# Patient Record
Sex: MALE | Race: WHITE | Employment: UNEMPLOYED | URBAN - METROPOLITAN AREA
[De-identification: names, ages, dates, MRNs, and addresses within clinical notes are randomized per-mention and may not be internally consistent; named-entity substitution may affect disease eponyms.]

---

## 2019-01-28 ENCOUNTER — HOSPITAL ENCOUNTER (OUTPATIENT)
Facility: CLINIC | Age: 39
Discharge: HOME | End: 2019-01-28
Attending: EMERGENCY MEDICINE | Admitting: SURGERY
Payer: COMMERCIAL

## 2019-01-28 ENCOUNTER — ANESTHESIA EVENT (OUTPATIENT)
Dept: SURGERY | Facility: CLINIC | Age: 39
End: 2019-01-28

## 2019-01-28 ENCOUNTER — APPOINTMENT (OUTPATIENT)
Dept: ULTRASOUND IMAGING | Facility: CLINIC | Age: 39
End: 2019-01-28
Payer: COMMERCIAL

## 2019-01-28 ENCOUNTER — ANESTHESIA (OUTPATIENT)
Dept: SURGERY | Facility: CLINIC | Age: 39
End: 2019-01-28

## 2019-01-28 VITALS
OXYGEN SATURATION: 97 % | HEART RATE: 55 BPM | TEMPERATURE: 97.8 F | SYSTOLIC BLOOD PRESSURE: 136 MMHG | HEIGHT: 67 IN | DIASTOLIC BLOOD PRESSURE: 78 MMHG | WEIGHT: 200 LBS | BODY MASS INDEX: 31.39 KG/M2 | RESPIRATION RATE: 20 BRPM

## 2019-01-28 DIAGNOSIS — R10.11 RUQ ABDOMINAL PAIN: ICD-10-CM

## 2019-01-28 LAB
ALBUMIN SERPL-MCNC: 4 G/DL (ref 3.4–5)
ALP SERPL-CCNC: 81 U/L (ref 40–150)
ALT SERPL W P-5'-P-CCNC: 39 U/L (ref 0–70)
ANION GAP SERPL CALCULATED.3IONS-SCNC: 7 MMOL/L (ref 3–14)
AST SERPL W P-5'-P-CCNC: 22 U/L (ref 0–45)
BASOPHILS # BLD AUTO: 0 10E9/L (ref 0–0.2)
BASOPHILS NFR BLD AUTO: 0.2 %
BILIRUB SERPL-MCNC: 0.6 MG/DL (ref 0.2–1.3)
BUN SERPL-MCNC: 18 MG/DL (ref 7–30)
CALCIUM SERPL-MCNC: 8.6 MG/DL (ref 8.5–10.1)
CHLORIDE SERPL-SCNC: 107 MMOL/L (ref 94–109)
CO2 SERPL-SCNC: 29 MMOL/L (ref 20–32)
CREAT SERPL-MCNC: 1.07 MG/DL (ref 0.66–1.25)
DIFFERENTIAL METHOD BLD: ABNORMAL
EOSINOPHIL # BLD AUTO: 0 10E9/L (ref 0–0.7)
EOSINOPHIL NFR BLD AUTO: 0.2 %
ERYTHROCYTE [DISTWIDTH] IN BLOOD BY AUTOMATED COUNT: 13.2 % (ref 10–15)
GFR SERPL CREATININE-BSD FRML MDRD: 88 ML/MIN/{1.73_M2}
GLUCOSE SERPL-MCNC: 126 MG/DL (ref 70–99)
HCT VFR BLD AUTO: 42.5 % (ref 40–53)
HGB BLD-MCNC: 13.9 G/DL (ref 13.3–17.7)
IMM GRANULOCYTES # BLD: 0 10E9/L (ref 0–0.4)
IMM GRANULOCYTES NFR BLD: 0.2 %
LIPASE SERPL-CCNC: 122 U/L (ref 73–393)
LYMPHOCYTES # BLD AUTO: 0.7 10E9/L (ref 0.8–5.3)
LYMPHOCYTES NFR BLD AUTO: 5.6 %
MCH RBC QN AUTO: 28.2 PG (ref 26.5–33)
MCHC RBC AUTO-ENTMCNC: 32.7 G/DL (ref 31.5–36.5)
MCV RBC AUTO: 86 FL (ref 78–100)
MONOCYTES # BLD AUTO: 0.4 10E9/L (ref 0–1.3)
MONOCYTES NFR BLD AUTO: 3.1 %
NEUTROPHILS # BLD AUTO: 11.3 10E9/L (ref 1.6–8.3)
NEUTROPHILS NFR BLD AUTO: 90.7 %
NRBC # BLD AUTO: 0 10*3/UL
NRBC BLD AUTO-RTO: 0 /100
PLATELET # BLD AUTO: 229 10E9/L (ref 150–450)
POTASSIUM SERPL-SCNC: 4 MMOL/L (ref 3.4–5.3)
PROT SERPL-MCNC: 7.2 G/DL (ref 6.8–8.8)
RBC # BLD AUTO: 4.93 10E12/L (ref 4.4–5.9)
SODIUM SERPL-SCNC: 143 MMOL/L (ref 133–144)
WBC # BLD AUTO: 12.4 10E9/L (ref 4–11)

## 2019-01-28 PROCEDURE — 96365 THER/PROPH/DIAG IV INF INIT: CPT | Mod: 59

## 2019-01-28 PROCEDURE — 71000027 ZZH RECOVERY PHASE 2 EACH 15 MINS: Performed by: SURGERY

## 2019-01-28 PROCEDURE — 37000009 ZZH ANESTHESIA TECHNICAL FEE, EACH ADDTL 15 MIN: Performed by: SURGERY

## 2019-01-28 PROCEDURE — 25000128 H RX IP 250 OP 636: Performed by: EMERGENCY MEDICINE

## 2019-01-28 PROCEDURE — 25000132 ZZH RX MED GY IP 250 OP 250 PS 637: Performed by: EMERGENCY MEDICINE

## 2019-01-28 PROCEDURE — 36000056 ZZH SURGERY LEVEL 3 1ST 30 MIN: Performed by: SURGERY

## 2019-01-28 PROCEDURE — 76705 ECHO EXAM OF ABDOMEN: CPT

## 2019-01-28 PROCEDURE — 83690 ASSAY OF LIPASE: CPT | Performed by: EMERGENCY MEDICINE

## 2019-01-28 PROCEDURE — 25000128 H RX IP 250 OP 636: Performed by: NURSE ANESTHETIST, CERTIFIED REGISTERED

## 2019-01-28 PROCEDURE — 96361 HYDRATE IV INFUSION ADD-ON: CPT | Mod: 59

## 2019-01-28 PROCEDURE — 47562 LAPAROSCOPIC CHOLECYSTECTOMY: CPT | Performed by: SURGERY

## 2019-01-28 PROCEDURE — 25000125 ZZHC RX 250: Performed by: EMERGENCY MEDICINE

## 2019-01-28 PROCEDURE — 85025 COMPLETE CBC W/AUTO DIFF WBC: CPT | Performed by: EMERGENCY MEDICINE

## 2019-01-28 PROCEDURE — 25000132 ZZH RX MED GY IP 250 OP 250 PS 637: Performed by: NURSE ANESTHETIST, CERTIFIED REGISTERED

## 2019-01-28 PROCEDURE — 99204 OFFICE O/P NEW MOD 45 MIN: CPT | Mod: 57 | Performed by: SURGERY

## 2019-01-28 PROCEDURE — 71000012 ZZH RECOVERY PHASE 1 LEVEL 1 FIRST HR: Performed by: SURGERY

## 2019-01-28 PROCEDURE — 96375 TX/PRO/DX INJ NEW DRUG ADDON: CPT

## 2019-01-28 PROCEDURE — 88304 TISSUE EXAM BY PATHOLOGIST: CPT | Performed by: SURGERY

## 2019-01-28 PROCEDURE — 99285 EMERGENCY DEPT VISIT HI MDM: CPT | Mod: 25 | Performed by: EMERGENCY MEDICINE

## 2019-01-28 PROCEDURE — 96376 TX/PRO/DX INJ SAME DRUG ADON: CPT | Mod: 59

## 2019-01-28 PROCEDURE — 25000125 ZZHC RX 250: Performed by: NURSE ANESTHETIST, CERTIFIED REGISTERED

## 2019-01-28 PROCEDURE — 80053 COMPREHEN METABOLIC PANEL: CPT | Performed by: EMERGENCY MEDICINE

## 2019-01-28 PROCEDURE — 36000058 ZZH SURGERY LEVEL 3 EA 15 ADDTL MIN: Performed by: SURGERY

## 2019-01-28 PROCEDURE — 27210794 ZZH OR GENERAL SUPPLY STERILE: Performed by: SURGERY

## 2019-01-28 PROCEDURE — 88304 TISSUE EXAM BY PATHOLOGIST: CPT | Mod: 26 | Performed by: SURGERY

## 2019-01-28 PROCEDURE — 40000305 ZZH STATISTIC PRE PROC ASSESS I: Performed by: SURGERY

## 2019-01-28 PROCEDURE — 99285 EMERGENCY DEPT VISIT HI MDM: CPT | Mod: 25

## 2019-01-28 PROCEDURE — 25000125 ZZHC RX 250: Performed by: SURGERY

## 2019-01-28 PROCEDURE — 37000008 ZZH ANESTHESIA TECHNICAL FEE, 1ST 30 MIN: Performed by: SURGERY

## 2019-01-28 PROCEDURE — 27210995 ZZH RX 272: Performed by: SURGERY

## 2019-01-28 RX ORDER — LIDOCAINE 40 MG/G
CREAM TOPICAL
Status: DISCONTINUED | OUTPATIENT
Start: 2019-01-28 | End: 2019-01-28 | Stop reason: HOSPADM

## 2019-01-28 RX ORDER — HYDROMORPHONE HYDROCHLORIDE 1 MG/ML
0.3 INJECTION, SOLUTION INTRAMUSCULAR; INTRAVENOUS; SUBCUTANEOUS
Status: COMPLETED | OUTPATIENT
Start: 2019-01-28 | End: 2019-01-28

## 2019-01-28 RX ORDER — NALOXONE HYDROCHLORIDE 0.4 MG/ML
.1-.4 INJECTION, SOLUTION INTRAMUSCULAR; INTRAVENOUS; SUBCUTANEOUS
Status: DISCONTINUED | OUTPATIENT
Start: 2019-01-28 | End: 2019-01-28 | Stop reason: HOSPADM

## 2019-01-28 RX ORDER — FENTANYL CITRATE 50 UG/ML
INJECTION, SOLUTION INTRAMUSCULAR; INTRAVENOUS PRN
Status: DISCONTINUED | OUTPATIENT
Start: 2019-01-28 | End: 2019-01-28

## 2019-01-28 RX ORDER — KETOROLAC TROMETHAMINE 30 MG/ML
INJECTION, SOLUTION INTRAMUSCULAR; INTRAVENOUS PRN
Status: DISCONTINUED | OUTPATIENT
Start: 2019-01-28 | End: 2019-01-28

## 2019-01-28 RX ORDER — MEPERIDINE HYDROCHLORIDE 25 MG/ML
12.5 INJECTION INTRAMUSCULAR; INTRAVENOUS; SUBCUTANEOUS
Status: DISCONTINUED | OUTPATIENT
Start: 2019-01-28 | End: 2019-01-28 | Stop reason: HOSPADM

## 2019-01-28 RX ORDER — FENTANYL CITRATE 50 UG/ML
25-50 INJECTION, SOLUTION INTRAMUSCULAR; INTRAVENOUS
Status: CANCELLED | OUTPATIENT
Start: 2019-01-28

## 2019-01-28 RX ORDER — LEVOFLOXACIN 5 MG/ML
500 INJECTION, SOLUTION INTRAVENOUS EVERY 24 HOURS
Status: DISCONTINUED | OUTPATIENT
Start: 2019-01-28 | End: 2019-01-28 | Stop reason: HOSPADM

## 2019-01-28 RX ORDER — CELECOXIB 200 MG/1
200 CAPSULE ORAL ONCE
Status: COMPLETED | OUTPATIENT
Start: 2019-01-28 | End: 2019-01-28

## 2019-01-28 RX ORDER — ONDANSETRON 2 MG/ML
4 INJECTION INTRAMUSCULAR; INTRAVENOUS EVERY 30 MIN PRN
Status: DISCONTINUED | OUTPATIENT
Start: 2019-01-28 | End: 2019-01-28 | Stop reason: HOSPADM

## 2019-01-28 RX ORDER — ACETAMINOPHEN 325 MG/1
975 TABLET ORAL ONCE
Status: COMPLETED | OUTPATIENT
Start: 2019-01-28 | End: 2019-01-28

## 2019-01-28 RX ORDER — HYDROMORPHONE HYDROCHLORIDE 1 MG/ML
.3-.5 INJECTION, SOLUTION INTRAMUSCULAR; INTRAVENOUS; SUBCUTANEOUS EVERY 10 MIN PRN
Status: DISCONTINUED | OUTPATIENT
Start: 2019-01-28 | End: 2019-01-28 | Stop reason: HOSPADM

## 2019-01-28 RX ORDER — FENTANYL CITRATE 50 UG/ML
25-50 INJECTION, SOLUTION INTRAMUSCULAR; INTRAVENOUS
Status: DISCONTINUED | OUTPATIENT
Start: 2019-01-28 | End: 2019-01-28 | Stop reason: HOSPADM

## 2019-01-28 RX ORDER — ONDANSETRON 4 MG/1
4 TABLET, ORALLY DISINTEGRATING ORAL EVERY 30 MIN PRN
Status: DISCONTINUED | OUTPATIENT
Start: 2019-01-28 | End: 2019-01-28 | Stop reason: HOSPADM

## 2019-01-28 RX ORDER — KETOROLAC TROMETHAMINE 15 MG/ML
15 INJECTION, SOLUTION INTRAMUSCULAR; INTRAVENOUS ONCE
Status: COMPLETED | OUTPATIENT
Start: 2019-01-28 | End: 2019-01-28

## 2019-01-28 RX ORDER — SODIUM CHLORIDE, SODIUM LACTATE, POTASSIUM CHLORIDE, CALCIUM CHLORIDE 600; 310; 30; 20 MG/100ML; MG/100ML; MG/100ML; MG/100ML
INJECTION, SOLUTION INTRAVENOUS CONTINUOUS
Status: DISCONTINUED | OUTPATIENT
Start: 2019-01-28 | End: 2019-01-28 | Stop reason: HOSPADM

## 2019-01-28 RX ORDER — DEXAMETHASONE SODIUM PHOSPHATE 4 MG/ML
INJECTION, SOLUTION INTRA-ARTICULAR; INTRALESIONAL; INTRAMUSCULAR; INTRAVENOUS; SOFT TISSUE PRN
Status: DISCONTINUED | OUTPATIENT
Start: 2019-01-28 | End: 2019-01-28

## 2019-01-28 RX ORDER — BUPIVACAINE HYDROCHLORIDE AND EPINEPHRINE 5; 5 MG/ML; UG/ML
INJECTION, SOLUTION PERINEURAL PRN
Status: DISCONTINUED | OUTPATIENT
Start: 2019-01-28 | End: 2019-01-28 | Stop reason: HOSPADM

## 2019-01-28 RX ORDER — KETOROLAC TROMETHAMINE 30 MG/ML
30 INJECTION, SOLUTION INTRAMUSCULAR; INTRAVENOUS EVERY 6 HOURS PRN
Status: DISCONTINUED | OUTPATIENT
Start: 2019-01-28 | End: 2019-01-28 | Stop reason: HOSPADM

## 2019-01-28 RX ORDER — INDOCYANINE GREEN AND WATER 25 MG
2.5 KIT INJECTION ONCE
Status: COMPLETED | OUTPATIENT
Start: 2019-01-28 | End: 2019-01-28

## 2019-01-28 RX ORDER — PROPOFOL 10 MG/ML
INJECTION, EMULSION INTRAVENOUS PRN
Status: DISCONTINUED | OUTPATIENT
Start: 2019-01-28 | End: 2019-01-28

## 2019-01-28 RX ORDER — MORPHINE SULFATE 4 MG/ML
4 INJECTION, SOLUTION INTRAMUSCULAR; INTRAVENOUS
Status: COMPLETED | OUTPATIENT
Start: 2019-01-28 | End: 2019-01-28

## 2019-01-28 RX ORDER — HYDROCODONE BITARTRATE AND ACETAMINOPHEN 5; 325 MG/1; MG/1
1-2 TABLET ORAL EVERY 4 HOURS PRN
Status: DISCONTINUED | OUTPATIENT
Start: 2019-01-28 | End: 2019-01-28 | Stop reason: HOSPADM

## 2019-01-28 RX ORDER — HYDROCODONE BITARTRATE AND ACETAMINOPHEN 5; 325 MG/1; MG/1
1-2 TABLET ORAL EVERY 4 HOURS PRN
Qty: 20 TABLET | Refills: 0 | Status: SHIPPED | OUTPATIENT
Start: 2019-01-28 | End: 2019-02-27

## 2019-01-28 RX ADMIN — INDOCYANINE GREEN 2.5 MG: 25 INJECTION, POWDER, LYOPHILIZED, FOR SOLUTION INTRAVENOUS at 11:19

## 2019-01-28 RX ADMIN — TAZOBACTAM SODIUM AND PIPERACILLIN SODIUM 3.38 G: 375; 3 INJECTION, SOLUTION INTRAVENOUS at 07:37

## 2019-01-28 RX ADMIN — ACETAMINOPHEN 975 MG: 325 TABLET, FILM COATED ORAL at 10:33

## 2019-01-28 RX ADMIN — DEXAMETHASONE SODIUM PHOSPHATE 8 MG: 4 INJECTION, SOLUTION INTRA-ARTICULAR; INTRALESIONAL; INTRAMUSCULAR; INTRAVENOUS; SOFT TISSUE at 12:07

## 2019-01-28 RX ADMIN — MORPHINE SULFATE 4 MG: 4 INJECTION INTRAVENOUS at 08:20

## 2019-01-28 RX ADMIN — Medication 2 TABLET: at 13:59

## 2019-01-28 RX ADMIN — ROCURONIUM BROMIDE 50 MG: 10 INJECTION INTRAVENOUS at 12:07

## 2019-01-28 RX ADMIN — SODIUM CHLORIDE, POTASSIUM CHLORIDE, SODIUM LACTATE AND CALCIUM CHLORIDE: 600; 310; 30; 20 INJECTION, SOLUTION INTRAVENOUS at 12:04

## 2019-01-28 RX ADMIN — LIDOCAINE HYDROCHLORIDE 30 ML: 20 SOLUTION ORAL; TOPICAL at 05:29

## 2019-01-28 RX ADMIN — ONDANSETRON 4 MG: 2 INJECTION INTRAMUSCULAR; INTRAVENOUS at 05:30

## 2019-01-28 RX ADMIN — PROPOFOL 200 MG: 10 INJECTION, EMULSION INTRAVENOUS at 12:07

## 2019-01-28 RX ADMIN — CELECOXIB 200 MG: 200 CAPSULE ORAL at 10:33

## 2019-01-28 RX ADMIN — MIDAZOLAM 2 MG: 1 INJECTION INTRAMUSCULAR; INTRAVENOUS at 12:04

## 2019-01-28 RX ADMIN — KETOROLAC TROMETHAMINE 30 MG: 30 INJECTION, SOLUTION INTRAMUSCULAR at 12:07

## 2019-01-28 RX ADMIN — HYDROMORPHONE HYDROCHLORIDE 0.3 MG: 1 INJECTION, SOLUTION INTRAMUSCULAR; INTRAVENOUS; SUBCUTANEOUS at 10:34

## 2019-01-28 RX ADMIN — MORPHINE SULFATE 4 MG: 4 INJECTION INTRAVENOUS at 07:10

## 2019-01-28 RX ADMIN — ONDANSETRON 4 MG: 2 INJECTION INTRAMUSCULAR; INTRAVENOUS at 12:07

## 2019-01-28 RX ADMIN — LIDOCAINE HYDROCHLORIDE 40 MG: 10 INJECTION, SOLUTION EPIDURAL; INFILTRATION; INTRACAUDAL; PERINEURAL at 12:07

## 2019-01-28 RX ADMIN — KETOROLAC TROMETHAMINE 15 MG: 15 INJECTION, SOLUTION INTRAMUSCULAR; INTRAVENOUS at 05:30

## 2019-01-28 RX ADMIN — HYDROMORPHONE HYDROCHLORIDE 0.5 MG: 1 INJECTION, SOLUTION INTRAMUSCULAR; INTRAVENOUS; SUBCUTANEOUS at 13:41

## 2019-01-28 RX ADMIN — FENTANYL CITRATE 200 MCG: 50 INJECTION, SOLUTION INTRAMUSCULAR; INTRAVENOUS at 12:07

## 2019-01-28 RX ADMIN — MORPHINE SULFATE 4 MG: 4 INJECTION INTRAVENOUS at 05:30

## 2019-01-28 RX ADMIN — FENTANYL CITRATE 50 MCG: 50 INJECTION, SOLUTION INTRAMUSCULAR; INTRAVENOUS at 12:59

## 2019-01-28 SDOH — HEALTH STABILITY: MENTAL HEALTH: HOW OFTEN DO YOU HAVE A DRINK CONTAINING ALCOHOL?: NEVER

## 2019-01-28 ASSESSMENT — MIFFLIN-ST. JEOR
SCORE: 1790.82
SCORE: 1790.82

## 2019-01-28 ASSESSMENT — ENCOUNTER SYMPTOMS
VOMITING: 0
SHORTNESS OF BREATH: 0
FEVER: 0
ABDOMINAL PAIN: 1
NAUSEA: 0
DIARRHEA: 0

## 2019-01-28 ASSESSMENT — PAIN DESCRIPTION - DESCRIPTORS: DESCRIPTORS: CONSTANT

## 2019-01-28 NOTE — DISCHARGE INSTRUCTIONS
Wash incisions daily with soap and water. Some mild redness or swelling is expected. If draining, cover with dry gauze.    No lifting restrictions.  You may lift as comfort permits.    Okay to use ice pack over wound as necessary for comfort.    Use pain medication as necessary but avoid constipating side effects. Ibuprofen okay but avoid Tylenol as your pain medication already contains this.    Diet as tolerated. No restrictions.    Follow up with Dr Nance in 1-2 weeks.                            Same Day Surgery Discharge Instructions  Special Precautions After Surgery - Adult    1. It is not unusual to feel lightheaded or faint, up to 24 hours after surgery or while taking pain medication.  If you have these symptoms; sit for a few minutes before standing and have someone assist you when getting up.  2. You should rest and relax for the next 24 hours and must have someone stay with you for at least 24 hours after your discharge.  3. DO NOT DRIVE any vehicle or operate mechanical equipment for 24 hours following the end of your surgery.  DO NOT DRIVE while taking narcotic pain medications that have been prescribed by your physician.  If you had a limb operated on, you must be able to use it fully to drive.  4. DO NOT drink alcoholic beverages for 24 hours following surgery or while taking prescription pain medication.  5. Drink clear liquids (apple juice, ginger ale, broth, 7-Up, etc.).  Progress to your regular diet as you feel able.  6. Any questions call your physician and do not make important decisions for 24 hours.    ACTIVITY  ? As directed per Dr. Nance     INCISIONAL CARE  ? May bathe / shower after 24 hours.  ? Apply ice 1/2 hour on and 1/2 hour off for first 48 hours.  ? Be alert for signs of infection:  redness, swelling, heat, drainage of pus, and/or elevated temperature.  Contact your doctor if these occur.        Call for an appointment to return to the clinic in 1-2  weeks    Medications:  ? Hydrocodone (Norco, Vicodin):  Next dose: ______________.  ? Ibuprofen as directed.  ? Follow the instructions on the bottle.       __________________________________________________________________________________________________________________________________  IMPORTANT NUMBERS:    [facility] [place]  [phone], [phone]  Pharmacy:  [phone]  Same Day Surgery:  [phone], Monday - Friday until 8:30 p.m.  Urgent Care:  [phone]  Emergency Room:  [phone]      [facility]:  [phone]                                                                             [facility]:  [phone]

## 2019-01-28 NOTE — CONSULTS
PCP:  No Ref-Primary, Physician    Chief complaint: Gallstones    History of Present Illness: Patient is a 38-year-old healthy man who is currently an inmate at a local skilled nursing.  He has had 2 and now 3 episodes of right upper quadrant abdominal pain.  The first 1 occurred in December and he was taken to another hospital for evaluation.  He was found to have gallstones.  He was treated with pain medication and nothing further was done.    He had another episode in the interim which was not severe.    The third episode occurred last night and brought him to this emergency room.  He does not report any jaundice.  The pain is getting better now.  An ultrasound showed multiple large stones in the gallbladder.  There was some mild gallbladder wall thickening.  Liver functions were normal.  Previous abdominal surgery.    Histories:  History reviewed. No pertinent past medical history.    History reviewed. No pertinent surgical history.    History reviewed. No pertinent family history.    Social History     Tobacco Use     Smoking status: Unknown If Ever Smoked   Substance Use Topics     Alcohol use: No     Frequency: Never       No current outpatient medications on file.       No Known Allergies    Images:  Recent Results (from the past 744 hour(s))   Abdomen US, limited (RUQ only)    Narrative    ULTRASOUND ABDOMEN LIMITED RIGHT UPPER QUADRANT   1/28/2019 6:26 AM     HISTORY: Right upper quadrant pain.    COMPARISON: None.    FINDINGS: Normal hepatic echogenicity. No hepatic masses. A few  gallstones are present within a nondistended gallbladder. Mild wall  thickening of the gallbladder. No pericholecystic fluid. The  ultrasound technologist reports that the patient has focal tenderness  over the gallbladder. No intrahepatic biliary dilatation. The common  duct is not visualized. The right kidney has normal size and  echogenicity, measuring 11.1 cm in length. No right intrarenal  collecting system dilatation or calculi.  1.3 x 1.2 x 1.0 cm probable  cyst in the upper pole of the right kidney. No free fluid in the upper  right hemiabdomen.      Impression    IMPRESSION:   1. A few gallstones are present within a mildly thick-walled,  nondistended gallbladder. Additionally, the ultrasound technologist  reports that the patient has focal tenderness over the gallbladder.  These findings are not convincing for acute cholecystitis because the  gallbladder is not distended, but it cannot be excluded. If clinically  relevant, a HIDA scan could be considered for further evaluation.  2. No intrahepatic biliary dilatation. The common duct is not  well-visualized.    ANDREW REYES MD       Labs:  Results for orders placed or performed during the hospital encounter of 01/28/19   Abdomen US, limited (RUQ only)    Narrative    ULTRASOUND ABDOMEN LIMITED RIGHT UPPER QUADRANT   1/28/2019 6:26 AM     HISTORY: Right upper quadrant pain.    COMPARISON: None.    FINDINGS: Normal hepatic echogenicity. No hepatic masses. A few  gallstones are present within a nondistended gallbladder. Mild wall  thickening of the gallbladder. No pericholecystic fluid. The  ultrasound technologist reports that the patient has focal tenderness  over the gallbladder. No intrahepatic biliary dilatation. The common  duct is not visualized. The right kidney has normal size and  echogenicity, measuring 11.1 cm in length. No right intrarenal  collecting system dilatation or calculi. 1.3 x 1.2 x 1.0 cm probable  cyst in the upper pole of the right kidney. No free fluid in the upper  right hemiabdomen.      Impression    IMPRESSION:   1. A few gallstones are present within a mildly thick-walled,  nondistended gallbladder. Additionally, the ultrasound technologist  reports that the patient has focal tenderness over the gallbladder.  These findings are not convincing for acute cholecystitis because the  gallbladder is not distended, but it cannot be excluded. If  clinically  relevant, a HIDA scan could be considered for further evaluation.  2. No intrahepatic biliary dilatation. The common duct is not  well-visualized.    ANDREW REYES MD   CBC with platelets differential   Result Value Ref Range    WBC 12.4 (H) 4.0 - 11.0 10e9/L    RBC Count 4.93 4.4 - 5.9 10e12/L    Hemoglobin 13.9 13.3 - 17.7 g/dL    Hematocrit 42.5 40.0 - 53.0 %    MCV 86 78 - 100 fl    MCH 28.2 26.5 - 33.0 pg    MCHC 32.7 31.5 - 36.5 g/dL    RDW 13.2 10.0 - 15.0 %    Platelet Count 229 150 - 450 10e9/L    Diff Method Automated Method     % Neutrophils 90.7 %    % Lymphocytes 5.6 %    % Monocytes 3.1 %    % Eosinophils 0.2 %    % Basophils 0.2 %    % Immature Granulocytes 0.2 %    Nucleated RBCs 0 0 /100    Absolute Neutrophil 11.3 (H) 1.6 - 8.3 10e9/L    Absolute Lymphocytes 0.7 (L) 0.8 - 5.3 10e9/L    Absolute Monocytes 0.4 0.0 - 1.3 10e9/L    Absolute Eosinophils 0.0 0.0 - 0.7 10e9/L    Absolute Basophils 0.0 0.0 - 0.2 10e9/L    Abs Immature Granulocytes 0.0 0 - 0.4 10e9/L    Absolute Nucleated RBC 0.0    Comprehensive metabolic panel   Result Value Ref Range    Sodium 143 133 - 144 mmol/L    Potassium 4.0 3.4 - 5.3 mmol/L    Chloride 107 94 - 109 mmol/L    Carbon Dioxide 29 20 - 32 mmol/L    Anion Gap 7 3 - 14 mmol/L    Glucose 126 (H) 70 - 99 mg/dL    Urea Nitrogen 18 7 - 30 mg/dL    Creatinine 1.07 0.66 - 1.25 mg/dL    GFR Estimate 88 >60 mL/min/[1.73_m2]    GFR Estimate If Black >90 >60 mL/min/[1.73_m2]    Calcium 8.6 8.5 - 10.1 mg/dL    Bilirubin Total 0.6 0.2 - 1.3 mg/dL    Albumin 4.0 3.4 - 5.0 g/dL    Protein Total 7.2 6.8 - 8.8 g/dL    Alkaline Phosphatase 81 40 - 150 U/L    ALT 39 0 - 70 U/L    AST 22 0 - 45 U/L   Lipase   Result Value Ref Range    Lipase 122 73 - 393 U/L       ROS:  Constitutional - Denies fevers, weight loss, malaise, lethargy  Neuro - Denies tremors or seizures  Pulmon - Denies SOB, dyspnea, hemoptysis, chronic cough or use of an inhaler  CV - Denies CP, SOB, lower  "extremity edema, difficulty w/ stairs, has never used NTG  GI - Denies hematemesis, BRBPR, melena, chronic diarrhea    - Denies hematuria, difficulty voiding, h/o STDs  Hematology - Denies blood clotting disorders, chronic anemias  Dermatology - No melanomas or skin cancers  Rheumatology - No h/o RA  Pysch - Denies depression, bipolar d/o or schizophrenia    /79   Pulse 61   Temp 98.5  F (36.9  C) (Oral)   Resp 18   Ht 1.702 m (5' 7\")   Wt 90.7 kg (200 lb)   SpO2 99%   BMI 31.32 kg/m      Exam:  General - Alert and Oriented X4, NAD, well nourished  HEENT - Normocephalic, atraumatic  Neck - supple, no LAD  Lungs -respirations unlabored, chest wall excursion normal   CV - Heart RRR  Abdomen - Soft, mild tenderness right upper quadrant, no peritoneal signs, +BS, no hepatosplenomegaly, no palpable masses  Groins -deferred  Rectal -deferred  Neuro - Full ROM, Strength 5/5 and major muscle groups, sensation intact  Extremities - No cyanosis, clubbing or edema.      Assessment and Plan: Symptomatic cholelithiasis with possible early cholecystitis.  Being that he is a prisoner the best thing to do is simply take his gallbladder out today and send him back to his facility today.  I have tentatively scheduled him for noon.  The procedure, risks, benefits, and alternatives of laparoscopic cholecystectomy were discussed with him in detail.  He consented to proceed.  His signature was obtained.  Orders have been placed.    Keaton Nance MD FACS            "

## 2019-01-28 NOTE — BRIEF OP NOTE
[facility]    Brief Operative Note    Pre-operative diagnosis: gallstones  Post-operative diagnosis acute cholecystitis  Procedure: Procedure(s):  LAPAROSCOPIC CHOLECYSTECTOMY  Surgeon: Surgeon(s) and Role:     * Keaton Nance MD - Primary  Anesthesia: General   Estimated blood loss: Less than 10 ml  Drains: None  Specimens:   ID Type Source Tests Collected by Time Destination   A :  Organ Gallbladder and Contents SURGICAL PATHOLOGY EXAM Keaton Nance MD 1/28/2019  1:07 PM      Findings: Acute cholecystitis.  Complications: None.  Implants: None.

## 2019-01-28 NOTE — ANESTHESIA POSTPROCEDURE EVALUATION
Patient: Jose Elias Sousa    Procedure(s):  LAPAROSCOPIC CHOLECYSTECTOMY    Diagnosis:gallstones  Diagnosis Additional Information: No value filed.    Anesthesia Type:  General, ETT    Note:  Anesthesia Post Evaluation    Patient location during evaluation: Bedside  Patient participation: Able to fully participate in evaluation  Level of consciousness: awake  Pain management: adequate  Airway patency: patent  Cardiovascular status: acceptable  Respiratory status: acceptable  Hydration status: stable  PONV: none     Anesthetic complications: None          Last vitals:  Vitals:    01/28/19 1400 01/28/19 1403 01/28/19 1430   BP: 133/84  136/78   Pulse:      Resp: 20  20   Temp: 36.6  C (97.8  F)     SpO2: 96% 96% 97%         Electronically Signed By: TRIP Mortensen CRNA  January 28, 2019  4:53 PM

## 2019-01-28 NOTE — ANESTHESIA CARE TRANSFER NOTE
Patient: Jose Elias Sousa    Procedure(s):  LAPAROSCOPIC CHOLECYSTECTOMY    Diagnosis: gallstones  Diagnosis Additional Information: No value filed.    Anesthesia Type:   General, ETT     Note:  Anesthesia Care Transfer Notewriter    Vitals: (Last set prior to Anesthesia Care Transfer)    CRNA VITALS  1/28/2019 1246 - 1/28/2019 1333      1/28/2019             Pulse:  69    SpO2:  100 %                Electronically Signed By: Jone Nava CRNA, APRN CRNA  January 28, 2019  1:33 PM

## 2019-01-28 NOTE — ED PROVIDER NOTES
History     Chief Complaint   Patient presents with     Abdominal Pain     gall bladder pain had similar issue in [facility] in Dec. [place].     JOSELYN Sousa is a 38 year old male who presents to the emergency department from [facility] for complaints of right upper quadrant abdominal pain.  Patient states approximately 2-day history of increased amounts of right upper quadrant abdominal pain, sharp in nature, constant, with no significant aggravating factors.  Denies food changes, or worsening of pain with food intake.  There is been no vomiting.  No diarrhea.  No history of abdominal procedures.  Patient states that he had similar episode in December and was evaluated at [facility] with reports of gallstones seen on ultrasound.  No further follow-up has been performed.  Patient has been incarcerated since May, 2018.  No daily medications.  He has tried ibuprofen without relief of symptoms.    Allergies:  No Known Allergies    Problem List:    There are no active problems to display for this patient.       Past Medical History:    History reviewed. No pertinent past medical history.  None    Past Surgical History:    History reviewed. No pertinent surgical history.  None    Family History:    History reviewed. No pertinent family history.    Social History:  Marital Status:    Social History     Tobacco Use     Smoking status: Unknown If Ever Smoked   Substance Use Topics     Alcohol use: No     Frequency: Never     Drug use: No    No tobacco, alcohol or drug use.        Medications:      No current outpatient medications on file.      Review of Systems   Constitutional: Negative for fever.   Respiratory: Negative for shortness of breath.    Cardiovascular: Negative for chest pain.   Gastrointestinal: Positive for abdominal pain. Negative for diarrhea, nausea and vomiting.   All other systems reviewed and are negative.      Physical Exam   BP:  "136/85  Pulse: 55  Temp: 98.5  F (36.9  C)  Resp: 18  Height: 170.2 cm (5' 7\")  Weight: 90.7 kg (200 lb)  SpO2: 100 %      Physical Exam  /85   Pulse 55   Temp 98.5  F (36.9  C) (Oral)   Resp 18   Ht 1.702 m (5' 7\")   Wt 90.7 kg (200 lb)   SpO2 100%   BMI 31.32 kg/m    General: alert, interactive, in no apparent distress  Head: atraumatic  Nose: no rhinorrhea or epistaxis  Ears: no external auditory canal discharge or bleeding.    Eyes: Sclera nonicteric. Conjunctiva noninjected.    Mouth: no tonsillar erythema, edema, or exudate  Neck: supple, no palp LAD  Lungs: CTAB  CV: bradycardic, S1/S2; peripheral pulses palpable and symmetric  Abdomen: soft, tender in RUQ, nd, no guarding or rebound.   Extremities: no cyanosis or edema  Skin: no rash or diaphoresis  Neuro: a/o x4          ED Course        Procedures               Critical Care time:  none               Results for orders placed or performed during the hospital encounter of 01/28/19 (from the past 24 hour(s))   CBC with platelets differential   Result Value Ref Range    WBC 12.4 (H) 4.0 - 11.0 10e9/L    RBC Count 4.93 4.4 - 5.9 10e12/L    Hemoglobin 13.9 13.3 - 17.7 g/dL    Hematocrit 42.5 40.0 - 53.0 %    MCV 86 78 - 100 fl    MCH 28.2 26.5 - 33.0 pg    MCHC 32.7 31.5 - 36.5 g/dL    RDW 13.2 10.0 - 15.0 %    Platelet Count 229 150 - 450 10e9/L    Diff Method Automated Method     % Neutrophils 90.7 %    % Lymphocytes 5.6 %    % Monocytes 3.1 %    % Eosinophils 0.2 %    % Basophils 0.2 %    % Immature Granulocytes 0.2 %    Nucleated RBCs 0 0 /100    Absolute Neutrophil 11.3 (H) 1.6 - 8.3 10e9/L    Absolute Lymphocytes 0.7 (L) 0.8 - 5.3 10e9/L    Absolute Monocytes 0.4 0.0 - 1.3 10e9/L    Absolute Eosinophils 0.0 0.0 - 0.7 10e9/L    Absolute Basophils 0.0 0.0 - 0.2 10e9/L    Abs Immature Granulocytes 0.0 0 - 0.4 10e9/L    Absolute Nucleated RBC 0.0    Comprehensive metabolic panel   Result Value Ref Range    Sodium 143 133 - 144 mmol/L    Potassium " 4.0 3.4 - 5.3 mmol/L    Chloride 107 94 - 109 mmol/L    Carbon Dioxide 29 20 - 32 mmol/L    Anion Gap 7 3 - 14 mmol/L    Glucose 126 (H) 70 - 99 mg/dL    Urea Nitrogen 18 7 - 30 mg/dL    Creatinine 1.07 0.66 - 1.25 mg/dL    GFR Estimate 88 >60 mL/min/[1.73_m2]    GFR Estimate If Black >90 >60 mL/min/[1.73_m2]    Calcium 8.6 8.5 - 10.1 mg/dL    Bilirubin Total 0.6 0.2 - 1.3 mg/dL    Albumin 4.0 3.4 - 5.0 g/dL    Protein Total 7.2 6.8 - 8.8 g/dL    Alkaline Phosphatase 81 40 - 150 U/L    ALT 39 0 - 70 U/L    AST 22 0 - 45 U/L   Lipase   Result Value Ref Range    Lipase 122 73 - 393 U/L       Medications   ondansetron (ZOFRAN) injection 4 mg (4 mg Intravenous Given 1/28/19 0530)   morphine (PF) injection 4 mg (4 mg Intravenous Given 1/28/19 0530)   ketorolac (TORADOL) injection 15 mg (15 mg Intravenous Given 1/28/19 0530)   lidocaine VISCOUS (XYLOCAINE) 2 % 15 mL, alum & mag hydroxide-simethicone (MYLANTA ES/MAALOX  ES) 15 mL GI Cocktail (30 mLs Oral Given 1/28/19 0529)       Assessments & Plan (with Medical Decision Making)  38 year old male, with reported history of cholelithiasis, presenting to the emergency department with approximately 2-day history of increased amounts of abdominal pain.  Patient arrives afebrile, with normal vitals upon arrival.  He has 2-day history of worsening right upper quadrant abdominal pain, which is concerning for possible gallbladder etiology, especially given his history and previous workup at .  Additional items on differential include gastritis, GERD, peptic ulcer disease, hepatitis, biliary colic.  I attempted to review previous records in care everywhere, however unable to view any prior reports.  IV was established, labs drawn, ultrasound imaging ordered, and patient given analgesic medications.    Laboratory workup up to this point shows CBC that has slightly elevated white blood cell count at 12.4.  Patient does have some guarding, and tenderness to palpation  of the right upper quadrant.  He has been given analgesic medications.  Patient will be signed out to oncoming provider at 6 AM pending CMP, and lipase, and ultrasound results.  Disposition pending laboratory and ultrasound imaging results.  Patient is [facility] with [facility].  He has been incarcerated since May.    LFTs returned normal at 5:54am.  Lipase and US pending.     I have reviewed the nursing notes.    I have reviewed the findings, diagnosis, plan and need for follow up with the patient.          Medication List      There are no discharge medications for this visit.         Final diagnoses:   RUQ abdominal pain       1/28/2019   [facility] EMERGENCY DEPARTMENT     Keaton Duffy MD  01/28/19 0548       Keaton Duffy MD  01/28/19 0555

## 2019-01-28 NOTE — ED TRIAGE NOTES
"Pt presents with PD from assisted with \"gallbladder pain\" rates 10/10 states has had this pain before in Dec and was taken to [place] at that time and US was done showing gall stones  "

## 2019-01-28 NOTE — ED PROVIDER NOTES
"     Emergency Department Patient Sign-out       Brief HPI:  This is a 38 year old male signed out to me by Dr. Duffy.  See initial ED Provider note for details of the presentation.            Significant Events prior to my assuming care: Patient has had labs drawn with a slightly elevated white count and left shift CMP is normal lipase is normal awaiting ultrasound results.  Patient is been given Toradol GI cocktail and morphine for pain.      Exam:   Patient Vitals for the past 24 hrs:   BP Temp Temp src Pulse Resp SpO2 Height Weight   01/28/19 0521 136/85 98.5  F (36.9  C) Oral 55 18 100 % 1.702 m (5' 7\") 90.7 kg (200 lb)           ED RESULTS:   Results for orders placed or performed during the hospital encounter of 01/28/19 (from the past 24 hour(s))   CBC with platelets differential     Status: Abnormal    Collection Time: 01/28/19  5:26 AM   Result Value Ref Range    WBC 12.4 (H) 4.0 - 11.0 10e9/L    RBC Count 4.93 4.4 - 5.9 10e12/L    Hemoglobin 13.9 13.3 - 17.7 g/dL    Hematocrit 42.5 40.0 - 53.0 %    MCV 86 78 - 100 fl    MCH 28.2 26.5 - 33.0 pg    MCHC 32.7 31.5 - 36.5 g/dL    RDW 13.2 10.0 - 15.0 %    Platelet Count 229 150 - 450 10e9/L    Diff Method Automated Method     % Neutrophils 90.7 %    % Lymphocytes 5.6 %    % Monocytes 3.1 %    % Eosinophils 0.2 %    % Basophils 0.2 %    % Immature Granulocytes 0.2 %    Nucleated RBCs 0 0 /100    Absolute Neutrophil 11.3 (H) 1.6 - 8.3 10e9/L    Absolute Lymphocytes 0.7 (L) 0.8 - 5.3 10e9/L    Absolute Monocytes 0.4 0.0 - 1.3 10e9/L    Absolute Eosinophils 0.0 0.0 - 0.7 10e9/L    Absolute Basophils 0.0 0.0 - 0.2 10e9/L    Abs Immature Granulocytes 0.0 0 - 0.4 10e9/L    Absolute Nucleated RBC 0.0    Comprehensive metabolic panel     Status: Abnormal    Collection Time: 01/28/19  5:26 AM   Result Value Ref Range    Sodium 143 133 - 144 mmol/L    Potassium 4.0 3.4 - 5.3 mmol/L    Chloride 107 94 - 109 mmol/L    Carbon Dioxide 29 20 - 32 mmol/L    Anion Gap 7 3 - " 14 mmol/L    Glucose 126 (H) 70 - 99 mg/dL    Urea Nitrogen 18 7 - 30 mg/dL    Creatinine 1.07 0.66 - 1.25 mg/dL    GFR Estimate 88 >60 mL/min/[1.73_m2]    GFR Estimate If Black >90 >60 mL/min/[1.73_m2]    Calcium 8.6 8.5 - 10.1 mg/dL    Bilirubin Total 0.6 0.2 - 1.3 mg/dL    Albumin 4.0 3.4 - 5.0 g/dL    Protein Total 7.2 6.8 - 8.8 g/dL    Alkaline Phosphatase 81 40 - 150 U/L    ALT 39 0 - 70 U/L    AST 22 0 - 45 U/L   Lipase     Status: None    Collection Time: 01/28/19  5:26 AM   Result Value Ref Range    Lipase 122 73 - 393 U/L     Results for orders placed or performed during the hospital encounter of 01/28/19   Abdomen US, limited (RUQ only)    Narrative    ULTRASOUND ABDOMEN LIMITED RIGHT UPPER QUADRANT   1/28/2019 6:26 AM     HISTORY: Right upper quadrant pain.    COMPARISON: None.    FINDINGS: Normal hepatic echogenicity. No hepatic masses. A few  gallstones are present within a nondistended gallbladder. Mild wall  thickening of the gallbladder. No pericholecystic fluid. The  ultrasound technologist reports that the patient has focal tenderness  over the gallbladder. No intrahepatic biliary dilatation. The common  duct is not visualized. The right kidney has normal size and  echogenicity, measuring 11.1 cm in length. No right intrarenal  collecting system dilatation or calculi. 1.3 x 1.2 x 1.0 cm probable  cyst in the upper pole of the right kidney. No free fluid in the upper  right hemiabdomen.      Impression    IMPRESSION:   1. A few gallstones are present within a mildly thick-walled,  nondistended gallbladder. Additionally, the ultrasound technologist  reports that the patient has focal tenderness over the gallbladder.  These findings are not convincing for acute cholecystitis because the  gallbladder is not distended, but it cannot be excluded. If clinically  relevant, a HIDA scan could be considered for further evaluation.  2. No intrahepatic biliary dilatation. The common duct is  not  well-visualized.    ANDREW REYES MD       ED MEDICATIONS:   Medications   ondansetron (ZOFRAN) injection 4 mg (4 mg Intravenous Given 1/28/19 0530)   morphine (PF) injection 4 mg (4 mg Intravenous Given 1/28/19 0530)   ketorolac (TORADOL) injection 15 mg (15 mg Intravenous Given 1/28/19 0530)   lidocaine VISCOUS (XYLOCAINE) 2 % 15 mL, alum & mag hydroxide-simethicone (MYLANTA ES/MAALOX  ES) 15 mL GI Cocktail (30 mLs Oral Given 1/28/19 0529)         Impression:    ICD-10-CM    1. RUQ abdominal pain R10.11 CBC with platelets differential     Comprehensive metabolic panel     Lipase       Plan:    Discussed case with Dr. Nance general surgeon with [facility].  Patient has right upper quadrant tenderness with a mildly thickened nondistended gallbladder.  White count is mildly elevated and liver functions are normal.  He is recommended admitting to hospitalist service with plans for operating on the patient later today or tomorrow.  He is recommended covering with antibiotics.  Zosyn IV was ordered.  He will see the patient today.  Contacted Dr. Lanza with hospitalist service.  Dr. Lanza did not feel patient needed to be admitted to the hospital and therefore Dr. Nance decided to take him to the OR later this morning.  He will be transferred to same-day surgery for further evaluation and care.      Keaton Veloz MD  01/29/19 2004

## 2019-01-28 NOTE — OR NURSING
Report called to Ekta GLEASON at [facility]. Discharge instructions faxed to her at [phone] per her request. Pt voided prior to discharge.

## 2019-01-29 NOTE — OP NOTE
Procedure Date: 01/28/2019      PREOPERATIVE DIAGNOSIS:  Acute cholecystitis.      POSTOPERATIVE DIAGNOSIS:  Acute cholecystitis.      PROCEDURE:  Laparoscopic cholecystectomy.      SURGEON:  Keaton Nance MD      ANESTHESIA:  General.      INDICATIONS:  This is a 38-year-old male who is a resident of a local [facility].  He presented with right upper quadrant pain.  An ultrasound showed a contracted gallbladder with stones.  The wall was thickened.  He had a white count that was mildly elevated.  A diagnosis of acute cholecystitis was made and it was elected to take him to surgery today for cholecystectomy.  Prior to the procedure, he was counseled about the risks, benefits and alternatives of the procedure and he agreed to proceed.  All of his questions were answered.      DESCRIPTION OF PROCEDURE:  The patient was brought to the operating room and placed on the table in the supine position.  After induction of adequate general endotracheal anesthesia, the patient's abdomen was clipped of extraneous hairs, prepped and draped in the usual sterile fashion.  A surgical timeout was performed to identify both the patient and the proposed procedure.  All present were in agreement.      A small infraumbilical incision was made and dissection carried down through subcutaneous tissues to the level of the fascia.  The fascia was incised in the midline and 2 stay sutures of 0 Vicryl were placed.  The fascia was further elevated and the peritoneal cavity bluntly entered.  The Ary trocar was placed and the abdomen insufflated to 15 mmHg pressure with CO2 gas.  Three further 5 mm ports were placed under direct vision in the right upper quadrant.  The patient was then placed in a steep reverse Trendelenburg position with the left side rotated downward.  The gallbladder was identified and was acutely inflamed and tense.  I aspirated about 15 mL of dark green bile from it before we could attempt to grab it.  A single grasper was  placed at the fundus and a second one at the infundibulum.  These were both elevated and the gallbladder retracted laterally.  The area overlying the cystic duct and cystic artery were carefully dissected out.  Using indocyanine green technique, we were able to identify the cystic duct which was rather short.  We saw where it connected to the common bile duct and we were careful not to injure that structure.  Two clips were placed distally on the cystic duct, 1 proximally, and it was divided with a scissors.  The cystic artery was right behind it and was also quite short.  This was clipped and divided.  The gallbladder was then removed from the gallbladder bed using cautery.  This was a tedious dissection due to the inflammation and edema present.  All bleeders were coagulated as they were encountered.      The gallbladder was then placed into an Endobag and retrieved through these infraumbilical trocar site.  The Ary was replaced and the right upper quadrant irrigated and aspirated.  A couple of small bleeding spots were cauterized on the liver bed.  We then filled the area with FloSeal for a little bit of additional hemostasis.  The irrigant was aspirated free.  The ports were then removed under direct vision.  No bleeding seen.  The infraumbilical fascia was closed with a couple of interrupted figure-of-eight 0 Vicryl sutures.  All the wounds were infiltrated with Marcaine with epinephrine and closed with subcuticular Vicryl suture.  The wounds were covered with surgical glue.        The patient was then awakened from his anesthetic and taken to the recovery room awake and in stable condition.  He tolerated the procedure well.  There were no complications.  Needle, sponge, and instrument counts were correct x2.         ILDEFONSO EWING MD             D: 2019   T: 2019   MT: HEMANT      Name:     MARY RAMOS   MRN:      [id number]        Account:        [id number]   :      1980            Procedure Date: 01/28/2019      Document: [id number]

## 2019-01-30 LAB — COPATH REPORT: NORMAL

## (undated) DEVICE — ADHESIVE SWIFTSET 0.8ML OCTYL SS6

## (undated) DEVICE — ENDO TROCAR SLEEVE KII ADV FIXATION 05X100MM CFS02

## (undated) DEVICE — ADH FLOSEAL W/HUMAN THROMBIN 5ML

## (undated) DEVICE — ESU CORD MONOPOLAR 10'  E0510

## (undated) DEVICE — ESU PENCIL W/COATED BLADE E2450H

## (undated) DEVICE — ENDO FLOSEAL APPLICATOR 1500181

## (undated) DEVICE — RAD RX CONRAY 60% (50ML) CHARGE PER ML

## (undated) DEVICE — ESU ENDO SCISSORS 5MM CVD 5DCS

## (undated) DEVICE — SOL NACL 0.9% IRRIG 1000ML BOTTLE 07138-09

## (undated) DEVICE — ENDO POUCH UNIV RETRIEVAL SYSTEM INZII 10MM CD001

## (undated) DEVICE — GOWN XLG DISP 9545

## (undated) DEVICE — KIT PROCEDURE PINPOINT PP9036

## (undated) DEVICE — Device

## (undated) DEVICE — SUCTION IRRIGATION STRYKFLOW II W/TIP DISP 250-070-520

## (undated) DEVICE — DRAPE POUCH INSTRUMENT 3 POCKET 1018L

## (undated) DEVICE — SOL WATER IRRIG 1000ML BOTTLE 07139-09

## (undated) DEVICE — CLIP APPLIER ENDO 5MM M/L LIGAMAX EL5ML

## (undated) DEVICE — PREP CHLORAPREP 26ML TINTED ORANGE  260815

## (undated) DEVICE — ENDO TROCAR BLUNT TIP KII BALLOON 12X100MM C0R47

## (undated) DEVICE — SU VICRYL 4-0 FS-2 27" J422-H

## (undated) DEVICE — DECANTER VIAL 2006S

## (undated) DEVICE — ESU HOLSTER PLASTIC DISP E2400

## (undated) DEVICE — GLOVE PROTEXIS W/NEU-THERA 8.0  2D73TE80

## (undated) DEVICE — ENDO TROCAR FIRST ENTRY KII FIOS ADV FIX 05X100MM CFF03

## (undated) DEVICE — SU VICRYL 0 UR-6 27" J603H

## (undated) RX ORDER — DEXAMETHASONE SODIUM PHOSPHATE 4 MG/ML
INJECTION, SOLUTION INTRA-ARTICULAR; INTRALESIONAL; INTRAMUSCULAR; INTRAVENOUS; SOFT TISSUE
Status: DISPENSED
Start: 2019-01-28

## (undated) RX ORDER — LIDOCAINE HYDROCHLORIDE 10 MG/ML
INJECTION, SOLUTION EPIDURAL; INFILTRATION; INTRACAUDAL; PERINEURAL
Status: DISPENSED
Start: 2019-01-28

## (undated) RX ORDER — ONDANSETRON 2 MG/ML
INJECTION INTRAMUSCULAR; INTRAVENOUS
Status: DISPENSED
Start: 2019-01-28

## (undated) RX ORDER — CELECOXIB 200 MG/1
CAPSULE ORAL
Status: DISPENSED
Start: 2019-01-28

## (undated) RX ORDER — ACETAMINOPHEN 325 MG/1
TABLET ORAL
Status: DISPENSED
Start: 2019-01-28

## (undated) RX ORDER — LEVOFLOXACIN 5 MG/ML
INJECTION, SOLUTION INTRAVENOUS
Status: DISPENSED
Start: 2019-01-28

## (undated) RX ORDER — HYDROCODONE BITARTRATE AND ACETAMINOPHEN 5; 325 MG/1; MG/1
TABLET ORAL
Status: DISPENSED
Start: 2019-01-28

## (undated) RX ORDER — PROPOFOL 10 MG/ML
INJECTION, EMULSION INTRAVENOUS
Status: DISPENSED
Start: 2019-01-28

## (undated) RX ORDER — HYDROMORPHONE HYDROCHLORIDE 1 MG/ML
INJECTION, SOLUTION INTRAMUSCULAR; INTRAVENOUS; SUBCUTANEOUS
Status: DISPENSED
Start: 2019-01-28

## (undated) RX ORDER — BUPIVACAINE HYDROCHLORIDE AND EPINEPHRINE 5; 5 MG/ML; UG/ML
INJECTION, SOLUTION EPIDURAL; INTRACAUDAL; PERINEURAL
Status: DISPENSED
Start: 2019-01-28

## (undated) RX ORDER — FENTANYL CITRATE 50 UG/ML
INJECTION, SOLUTION INTRAMUSCULAR; INTRAVENOUS
Status: DISPENSED
Start: 2019-01-28